# Patient Record
Sex: FEMALE | Race: WHITE
[De-identification: names, ages, dates, MRNs, and addresses within clinical notes are randomized per-mention and may not be internally consistent; named-entity substitution may affect disease eponyms.]

---

## 2019-09-25 ENCOUNTER — HOSPITAL ENCOUNTER (EMERGENCY)
Dept: HOSPITAL 35 - ER | Age: 34
Discharge: HOME | End: 2019-09-25
Payer: COMMERCIAL

## 2019-09-25 VITALS — BODY MASS INDEX: 33.9 KG/M2 | HEIGHT: 67 IN | WEIGHT: 216.01 LBS

## 2019-09-25 VITALS — DIASTOLIC BLOOD PRESSURE: 70 MMHG | SYSTOLIC BLOOD PRESSURE: 99 MMHG

## 2019-09-25 DIAGNOSIS — F32.9: ICD-10-CM

## 2019-09-25 DIAGNOSIS — Z91.040: ICD-10-CM

## 2019-09-25 DIAGNOSIS — Z88.2: ICD-10-CM

## 2019-09-25 DIAGNOSIS — Z88.8: ICD-10-CM

## 2019-09-25 DIAGNOSIS — E66.9: ICD-10-CM

## 2019-09-25 DIAGNOSIS — I10: ICD-10-CM

## 2019-09-25 DIAGNOSIS — Z88.0: ICD-10-CM

## 2019-09-25 DIAGNOSIS — F84.5: ICD-10-CM

## 2019-09-25 DIAGNOSIS — B34.9: Primary | ICD-10-CM

## 2019-09-25 DIAGNOSIS — R11.2: ICD-10-CM

## 2019-09-25 LAB
ALBUMIN SERPL-MCNC: 3.4 G/DL (ref 3.4–5)
ALT SERPL-CCNC: 21 U/L (ref 30–65)
ANION GAP SERPL CALC-SCNC: 9 MMOL/L (ref 7–16)
AST SERPL-CCNC: 17 U/L (ref 15–37)
BASOPHILS NFR BLD AUTO: 0.7 % (ref 0–2)
BILIRUB SERPL-MCNC: 0.3 MG/DL
BILIRUB UR-MCNC: NEGATIVE MG/DL
BUN SERPL-MCNC: 10 MG/DL (ref 7–18)
CALCIUM SERPL-MCNC: 8.5 MG/DL (ref 8.5–10.1)
CHLORIDE SERPL-SCNC: 105 MMOL/L (ref 98–107)
CO2 SERPL-SCNC: 26 MMOL/L (ref 21–32)
COLOR UR: YELLOW
CREAT SERPL-MCNC: 0.9 MG/DL (ref 0.6–1)
EOSINOPHIL NFR BLD: 1.6 % (ref 0–3)
ERYTHROCYTE [DISTWIDTH] IN BLOOD BY AUTOMATED COUNT: 13.5 % (ref 10.5–14.5)
GLUCOSE SERPL-MCNC: 102 MG/DL (ref 74–106)
GRANULOCYTES NFR BLD MANUAL: 56.5 % (ref 36–66)
HCT VFR BLD CALC: 41.5 % (ref 37–47)
HGB BLD-MCNC: 14 GM/DL (ref 12–15)
KETONES UR STRIP-MCNC: NEGATIVE MG/DL
LIPASE: 122 U/L (ref 73–393)
LYMPHOCYTES NFR BLD AUTO: 32.7 % (ref 24–44)
MCH RBC QN AUTO: 30.4 PG (ref 26–34)
MCHC RBC AUTO-ENTMCNC: 33.8 G/DL (ref 28–37)
MCV RBC: 90.2 FL (ref 80–100)
MONOCYTES NFR BLD: 8.5 % (ref 1–8)
NEUTROPHILS # BLD: 4.5 THOU/UL (ref 1.4–8.2)
PLATELET # BLD: 462 THOU/UL (ref 150–400)
POTASSIUM SERPL-SCNC: 4.3 MMOL/L (ref 3.5–5.1)
PROT SERPL-MCNC: 7.7 G/DL (ref 6.4–8.2)
RBC # BLD AUTO: 4.61 MIL/UL (ref 4.2–5)
RBC # UR STRIP: NEGATIVE /UL
SODIUM SERPL-SCNC: 140 MMOL/L (ref 136–145)
SP GR UR STRIP: <= 1.005 (ref 1–1.03)
URINE CLARITY: CLEAR
URINE GLUCOSE-RANDOM*: NEGATIVE
URINE LEUKOCYTES-REFLEX: NEGATIVE
URINE NITRITE-REFLEX: NEGATIVE
URINE PROTEIN (DIPSTICK): NEGATIVE
UROBILINOGEN UR STRIP-ACNC: 0.2 E.U./DL (ref 0.2–1)
WBC # BLD AUTO: 8 THOU/UL (ref 4–11)

## 2021-02-24 ENCOUNTER — HOSPITAL ENCOUNTER (EMERGENCY)
Dept: HOSPITAL 35 - ER | Age: 36
Discharge: HOME | End: 2021-02-24
Payer: COMMERCIAL

## 2021-02-24 VITALS — WEIGHT: 214.99 LBS | BODY MASS INDEX: 33.74 KG/M2 | HEIGHT: 67 IN

## 2021-02-24 VITALS — SYSTOLIC BLOOD PRESSURE: 123 MMHG | DIASTOLIC BLOOD PRESSURE: 87 MMHG

## 2021-02-24 DIAGNOSIS — Z79.899: ICD-10-CM

## 2021-02-24 DIAGNOSIS — Z91.040: ICD-10-CM

## 2021-02-24 DIAGNOSIS — I10: ICD-10-CM

## 2021-02-24 DIAGNOSIS — Z88.2: ICD-10-CM

## 2021-02-24 DIAGNOSIS — Z88.8: ICD-10-CM

## 2021-02-24 DIAGNOSIS — Z20.822: ICD-10-CM

## 2021-02-24 DIAGNOSIS — J18.9: Primary | ICD-10-CM

## 2021-02-24 DIAGNOSIS — Z88.0: ICD-10-CM

## 2021-02-24 LAB
BILIRUB UR-MCNC: NEGATIVE MG/DL
COLOR UR: YELLOW
KETONES UR STRIP-MCNC: NEGATIVE MG/DL
RBC # UR STRIP: NEGATIVE /UL
SP GR UR STRIP: 1.01 (ref 1–1.03)
URINE CLARITY: CLEAR
URINE GLUCOSE-RANDOM*: NEGATIVE
URINE LEUKOCYTES-REFLEX: NEGATIVE
URINE NITRITE-REFLEX: NEGATIVE
URINE PROTEIN (DIPSTICK): NEGATIVE
UROBILINOGEN UR STRIP-ACNC: 0.2 E.U./DL (ref 0.2–1)

## 2021-03-01 ENCOUNTER — HOSPITAL ENCOUNTER (EMERGENCY)
Dept: HOSPITAL 35 - ER | Age: 36
Discharge: HOME | End: 2021-03-01
Payer: COMMERCIAL

## 2021-03-01 VITALS — HEIGHT: 67 IN | BODY MASS INDEX: 34.53 KG/M2 | WEIGHT: 220 LBS

## 2021-03-01 VITALS — DIASTOLIC BLOOD PRESSURE: 89 MMHG | SYSTOLIC BLOOD PRESSURE: 123 MMHG

## 2021-03-01 DIAGNOSIS — I10: ICD-10-CM

## 2021-03-01 DIAGNOSIS — Z88.0: ICD-10-CM

## 2021-03-01 DIAGNOSIS — Z91.040: ICD-10-CM

## 2021-03-01 DIAGNOSIS — B02.39: Primary | ICD-10-CM

## 2021-03-01 DIAGNOSIS — Z79.899: ICD-10-CM

## 2021-03-01 DIAGNOSIS — Z88.2: ICD-10-CM

## 2021-03-01 DIAGNOSIS — Z88.8: ICD-10-CM

## 2021-03-23 ENCOUNTER — HOSPITAL ENCOUNTER (INPATIENT)
Dept: HOSPITAL 35 - ER | Age: 36
LOS: 4 days | Discharge: HOME | DRG: 871 | End: 2021-03-27
Attending: INTERNAL MEDICINE | Admitting: INTERNAL MEDICINE
Payer: COMMERCIAL

## 2021-03-23 VITALS — WEIGHT: 212.6 LBS | BODY MASS INDEX: 33.37 KG/M2 | HEIGHT: 67 IN

## 2021-03-23 VITALS — SYSTOLIC BLOOD PRESSURE: 127 MMHG | DIASTOLIC BLOOD PRESSURE: 76 MMHG

## 2021-03-23 DIAGNOSIS — F84.5: ICD-10-CM

## 2021-03-23 DIAGNOSIS — E66.01: ICD-10-CM

## 2021-03-23 DIAGNOSIS — J85.1: ICD-10-CM

## 2021-03-23 DIAGNOSIS — Z20.822: ICD-10-CM

## 2021-03-23 DIAGNOSIS — Z88.8: ICD-10-CM

## 2021-03-23 DIAGNOSIS — I10: ICD-10-CM

## 2021-03-23 DIAGNOSIS — A41.9: Primary | ICD-10-CM

## 2021-03-23 DIAGNOSIS — Z88.0: ICD-10-CM

## 2021-03-23 DIAGNOSIS — Z88.2: ICD-10-CM

## 2021-03-23 DIAGNOSIS — F32.9: ICD-10-CM

## 2021-03-23 DIAGNOSIS — Z91.040: ICD-10-CM

## 2021-03-23 LAB
ALBUMIN SERPL-MCNC: 3 G/DL (ref 3.4–5)
ALT SERPL-CCNC: 16 U/L (ref 14–59)
ANION GAP SERPL CALC-SCNC: 9 MMOL/L (ref 7–16)
AST SERPL-CCNC: 12 U/L (ref 15–37)
BILIRUB SERPL-MCNC: 0.3 MG/DL (ref 0.2–1)
BUN SERPL-MCNC: 8 MG/DL (ref 7–18)
CALCIUM SERPL-MCNC: 9.4 MG/DL (ref 8.5–10.1)
CHLORIDE SERPL-SCNC: 98 MMOL/L (ref 98–107)
CO2 SERPL-SCNC: 27 MMOL/L (ref 21–32)
CREAT SERPL-MCNC: 1 MG/DL (ref 0.6–1)
EOSINOPHIL NFR BLD: 1 % (ref 0–3)
ERYTHROCYTE [DISTWIDTH] IN BLOOD BY AUTOMATED COUNT: 15.2 % (ref 10.5–14.5)
GLUCOSE SERPL-MCNC: 162 MG/DL (ref 74–106)
GRANULOCYTES NFR BLD MANUAL: 85 % (ref 36–66)
HCT VFR BLD CALC: 37.4 % (ref 37–47)
HGB BLD-MCNC: 12.3 GM/DL (ref 12–15)
LG PLATELETS BLD QL SMEAR: (no result)
LYMPHOCYTES NFR BLD AUTO: 10 % (ref 24–44)
MCH RBC QN AUTO: 29.3 PG (ref 26–34)
MCHC RBC AUTO-ENTMCNC: 32.9 G/DL (ref 28–37)
MCV RBC: 89.2 FL (ref 80–100)
MONOCYTES NFR BLD: 3 % (ref 1–8)
NEUTROPHILS # BLD: 17.8 THOU/UL (ref 1.4–8.2)
NEUTS BAND NFR BLD: 1 % (ref 0–8)
PLATELET # BLD: 580 THOU/UL (ref 150–400)
POTASSIUM SERPL-SCNC: 3.7 MMOL/L (ref 3.5–5.1)
PROT SERPL-MCNC: 8.4 G/DL (ref 6.4–8.2)
RBC # BLD AUTO: 4.19 MIL/UL (ref 4.2–5)
RBC MORPH BLD: NORMAL
SODIUM SERPL-SCNC: 134 MMOL/L (ref 136–145)
TROPONIN I SERPL-MCNC: <0.06 NG/ML (ref ?–0.06)
WBC # BLD AUTO: 20.7 THOU/UL (ref 4–11)

## 2021-03-23 PROCEDURE — 10879: CPT

## 2021-03-24 VITALS — DIASTOLIC BLOOD PRESSURE: 71 MMHG | SYSTOLIC BLOOD PRESSURE: 110 MMHG

## 2021-03-24 VITALS — DIASTOLIC BLOOD PRESSURE: 82 MMHG | SYSTOLIC BLOOD PRESSURE: 131 MMHG

## 2021-03-24 VITALS — DIASTOLIC BLOOD PRESSURE: 87 MMHG | SYSTOLIC BLOOD PRESSURE: 128 MMHG

## 2021-03-24 VITALS — DIASTOLIC BLOOD PRESSURE: 84 MMHG | SYSTOLIC BLOOD PRESSURE: 129 MMHG

## 2021-03-24 VITALS — SYSTOLIC BLOOD PRESSURE: 131 MMHG | DIASTOLIC BLOOD PRESSURE: 86 MMHG

## 2021-03-24 VITALS — DIASTOLIC BLOOD PRESSURE: 77 MMHG | SYSTOLIC BLOOD PRESSURE: 127 MMHG

## 2021-03-24 VITALS — DIASTOLIC BLOOD PRESSURE: 81 MMHG | SYSTOLIC BLOOD PRESSURE: 122 MMHG

## 2021-03-24 LAB
ANION GAP SERPL CALC-SCNC: 10 MMOL/L (ref 7–16)
BACTERIA-REFLEX: (no result) /HPF
BILIRUB UR-MCNC: NEGATIVE MG/DL
BUN SERPL-MCNC: 9 MG/DL (ref 7–18)
CALCIUM SERPL-MCNC: 9 MG/DL (ref 8.5–10.1)
CHLORIDE SERPL-SCNC: 101 MMOL/L (ref 98–107)
CO2 SERPL-SCNC: 25 MMOL/L (ref 21–32)
COLOR UR: YELLOW
CREAT SERPL-MCNC: 1 MG/DL (ref 0.6–1)
ERYTHROCYTE [DISTWIDTH] IN BLOOD BY AUTOMATED COUNT: 14.9 % (ref 10.5–14.5)
GLUCOSE SERPL-MCNC: 150 MG/DL (ref 74–106)
HCT VFR BLD CALC: 37.6 % (ref 37–47)
HGB BLD-MCNC: 12.5 GM/DL (ref 12–15)
KETONES UR STRIP-MCNC: (no result) MG/DL
MCH RBC QN AUTO: 29.8 PG (ref 26–34)
MCHC RBC AUTO-ENTMCNC: 33.3 G/DL (ref 28–37)
MCV RBC: 89.3 FL (ref 80–100)
MUCUS: (no result) STRN/LPF
PLATELET # BLD: 554 THOU/UL (ref 150–400)
POTASSIUM SERPL-SCNC: 4.6 MMOL/L (ref 3.5–5.1)
RBC # BLD AUTO: 4.21 MIL/UL (ref 4.2–5)
RBC # UR STRIP: NEGATIVE /UL
SODIUM SERPL-SCNC: 136 MMOL/L (ref 136–145)
SP GR UR STRIP: 1.01 (ref 1–1.03)
SQUAMOUS: (no result) /LPF (ref 0–3)
URINE CLARITY: (no result)
URINE GLUCOSE-RANDOM*: NEGATIVE
URINE LEUKOCYTES-REFLEX: NEGATIVE
URINE NITRITE-REFLEX: NEGATIVE
URINE PROTEIN (DIPSTICK): (no result)
URINE WBC-REFLEX: (no result) /HPF (ref 0–5)
UROBILINOGEN UR STRIP-ACNC: 0.2 E.U./DL (ref 0.2–1)
WBC # BLD AUTO: 18.2 THOU/UL (ref 4–11)

## 2021-03-24 NOTE — EKG
[address] [place]  [address]
Phone:  [phone]                    ELECTROCARDIOGRAM REPORT      
_______________________________________________________________________________
 
Name:       BRAULIO CHRISTIANSON             Room #:         242-P       ADM IN  
M.R.#:      [id number]     Account #:      [id number]  
Admission:  21    Attend Phys:    Callie Martinez MD        
Discharge:              Date of Birth:  [birth year]  
                                                          Report #: [id number]
   [id number]
_______________________________________________________________________________
                          [facility]
                                       
Test Date:    2021               Test Time:    18:16:05
Pat Name:     BRAULIO CHRISTIANSON          Department:   
Patient ID:   [id number]            Room:         242
Gender:       F                        Technician:   MEDARDO
:          1985               Requested By: Jaime Martin
Order Number: [id number] MD:   Tal Lion
                                 Measurements
Intervals                              Axis          
Rate:         108                      P:            37
TX:           142                      QRS:          43
QRSD:         99                       T:            16
QT:           347                                    
QTc:          465                                    
                           Interpretive Statements
Sinus tachycardia
Probable left atrial enlargement
Baseline wander in lead(s) III,V1,V2
Compared to ECG 2005 18:52:05
Sinus rhythm no longer present
Electronically Signed On 3- 7:03:24 CDT by Tal Lion
[email or web address]
 
 
 
 
 
 
 
 
 
 
 
 
 
 
 
 
 
 
 
 
  <ELECTRONICALLY SIGNED>
   By: Tal Lion MD, [facility]    
  21
D: 21                           _____________________________________
T: 21                           Tal Lion MD, [facility]      /EPI

## 2021-03-25 VITALS — SYSTOLIC BLOOD PRESSURE: 126 MMHG | DIASTOLIC BLOOD PRESSURE: 84 MMHG

## 2021-03-25 VITALS — DIASTOLIC BLOOD PRESSURE: 79 MMHG | SYSTOLIC BLOOD PRESSURE: 121 MMHG

## 2021-03-25 VITALS — DIASTOLIC BLOOD PRESSURE: 89 MMHG | SYSTOLIC BLOOD PRESSURE: 138 MMHG

## 2021-03-25 VITALS — DIASTOLIC BLOOD PRESSURE: 95 MMHG | SYSTOLIC BLOOD PRESSURE: 138 MMHG

## 2021-03-25 LAB
BASOPHILS NFR BLD AUTO: 0.2 % (ref 0–2)
EOSINOPHIL NFR BLD: 0.4 % (ref 0–3)
ERYTHROCYTE [DISTWIDTH] IN BLOOD BY AUTOMATED COUNT: 14.6 % (ref 10.5–14.5)
GRANULOCYTES NFR BLD MANUAL: 74.8 % (ref 36–66)
HCT VFR BLD CALC: 32.9 % (ref 37–47)
HGB BLD-MCNC: 10.9 GM/DL (ref 12–15)
LYMPHOCYTES NFR BLD AUTO: 17.9 % (ref 24–44)
MCH RBC QN AUTO: 29.3 PG (ref 26–34)
MCHC RBC AUTO-ENTMCNC: 33 G/DL (ref 28–37)
MCV RBC: 88.9 FL (ref 80–100)
MONOCYTES NFR BLD: 6.7 % (ref 1–8)
NEUTROPHILS # BLD: 12.5 THOU/UL (ref 1.4–8.2)
PLATELET # BLD: 562 THOU/UL (ref 150–400)
RBC # BLD AUTO: 3.71 MIL/UL (ref 4.2–5)
WBC # BLD AUTO: 16.8 THOU/UL (ref 4–11)

## 2021-03-25 NOTE — HC
[facility]
[address]
[place]   [address]                     CONSULTATION                  
_______________________________________________________________________________
 
Name:       BRAULIO CHRISTIANSON             Room #:         354-P       ADM IN  
M.R.#:      [id number]                       Account #:      [id number]  
Admission:  03/23/21    Attend Phys:    Callie Martinez MD        
Discharge:              Date of Birth:  [birth year]  
                                                          Report #: [id number]
                                                                    [id number]   
_______________________________________________________________________________
THIS REPORT FOR:  
 
cc:  Michelle Tompkins MD, Molly MD Barry, Joseph W. MD                                           ~
 
DATE OF SERVICE:  03/24/2021
 
 
INFECTIOUS DISEASE CONSULTATION
 
ATTENDING PHYSICIAN:  Dr. Martinez.
 
REASON FOR EVALUATION:  Pneumonitis, possible pulmonary abscess.
 
HISTORY OF PRESENT ILLNESS:  Chart reviewed, patient examined.  This 35-year-old
female with a recent history of pneumonitis, was actually treated roughly 1
month ago, developed increasing difficulty with productive cough, also had what
sounds like pleuritic-type chest pain on the left.  Of note, she had some
anorexia and poor p.o. intake.  Denies significant GI related complaints.  Due
to her complaints, she was evaluated in the Emergency Room, felt to have a
mildly elevated lactic acid of 1.6.  White count had elevation of 20.7.  Chest
x-ray showed mass-like infiltrate, small left pleural effusion.  CT and followup
noted infiltrate and developing pulmonary abscess along the pleura of the left
lower lobe laterally 6.1 cm, small left pleural effusion.  Blood cultures
collected on admission are sterile thus far.  Urinalysis is unremarkable thus
far.  Coronavirus testing was negative.  Sputum culture has been collected,
which appeared to show contamination.  Currently, she has not required
supplemental oxygen.  She was empirically started on combination therapy with
levofloxacin and Zosyn.
 
ALLERGIES:  PENICILLIN, WHICH CAUSES A RASH; SULFA, WHICH CAUSES A RASH; LANOLIN
AND LATEX.
 
CURRENT MEDICATIONS:  Include gabapentin, metoprolol, amlodipine, loratadine,
alprazolam, fluoxetine, famotidine, Zosyn, Levaquin, p.r.n. analgesics and
antiemetics.
 
PAST MEDICAL HISTORY:  History of Asperger's syndrome, hypertension, depression,
autism.
 
SOCIAL HISTORY:  Nonsmoker, no ethanol, no illicit drug use.
 
FAMILY HISTORY:  Noncontributory.
 
REVIEW OF SYSTEMS:  Otherwise, unremarkable 10-point review of systems.
 
 
 
[address]   [address]                     CONSULTATION                  
_______________________________________________________________________________
 
Name:       ANTONETTEMARCIABRAULIO             Room #:         354-P       [facility] [place]  
[facility].#:      [id number]                       Account #:      [id number]  
Admission:  03/23/21    Attend Phys:    Callie Martinez MD        
Discharge:              Date of Birth:  [birth year]  
                                                          Report #: [id number]
                                                                    [id number]   
_______________________________________________________________________________
 
 
PHYSICAL EXAMINATION:
GENERAL:  Appears to be mildly encephalopathic, in mild-to-moderate distress.
VITAL SIGNS:  Temperature 97.7, pulse 91, respirations 18, blood pressure
131/86.
SKIN:  Warm, dry, no rashes.
HEENT:  Normocephalic.  Extraocular muscles are intact.
NECK:  Supple.
LUNGS:  Bilateral crackles at the bases.
HEART:  Regular.  I do not appreciate a murmur.
ABDOMEN:  Soft, nontender, nondistended.
EXTREMITIES:  No cyanosis.
GENITOURINARY AND RECTAL:  Deferred.
 
LABORATORY DATA:  Sputum cultures described above rejected due to likely
contaminant.  Coronavirus PCR was negative.  Procalcitonin of 0.12.  Lactic acid
repeated as well of 1.2.  CBC:  White count of 18.2, H and H of 12.5 and 37.6,
platelets of 554.  CTA chest PE protocol showed no evidence of PE, infiltrate
developing pulmonary abscess along the floor of the left lower lobe laterally. 
ProBNP of 88.
 
ASSESSMENT AND PLAN:  Pneumonitis, perhaps complicated by early pulmonary
abscess on the left lower lobe, does have what clinically sounds like
pleuritic-type chest pain.  We will continue antibiotic regimen, adjust therapy,
be concerned about a polymicrobial possible mixed infection.  At this point, she
is not overtly toxic.  She is not requiring supplemental oxygen, although may
need to repeat imaging here in the next few days to see if it localizes and
maybe would be amenable to percutaneous drainage.  Continue to monitor
expectantly.  She is tenuous at this point.
 
 
 
 
 
 
 
 
 
 
 
 
 
 
  <ELECTRONICALLY SIGNED>
   By: Carlos Lu MD           
  03/25/21     1222
D: 03/24/21 1701                           _____________________________________
T: 03/24/21 2025                           Carlos Lu MD             /nt

## 2021-03-26 VITALS — DIASTOLIC BLOOD PRESSURE: 76 MMHG | SYSTOLIC BLOOD PRESSURE: 129 MMHG

## 2021-03-26 VITALS — SYSTOLIC BLOOD PRESSURE: 130 MMHG | DIASTOLIC BLOOD PRESSURE: 84 MMHG

## 2021-03-26 VITALS — DIASTOLIC BLOOD PRESSURE: 73 MMHG | SYSTOLIC BLOOD PRESSURE: 143 MMHG

## 2021-03-27 VITALS — SYSTOLIC BLOOD PRESSURE: 135 MMHG | DIASTOLIC BLOOD PRESSURE: 98 MMHG

## 2021-03-27 VITALS — SYSTOLIC BLOOD PRESSURE: 119 MMHG | DIASTOLIC BLOOD PRESSURE: 84 MMHG

## 2021-03-27 VITALS — DIASTOLIC BLOOD PRESSURE: 84 MMHG | SYSTOLIC BLOOD PRESSURE: 119 MMHG

## 2021-03-27 LAB
ANION GAP SERPL CALC-SCNC: 11 MMOL/L (ref 7–16)
BASOPHILS NFR BLD AUTO: 0.7 % (ref 0–2)
BUN SERPL-MCNC: 6 MG/DL (ref 7–18)
CALCIUM SERPL-MCNC: 8.1 MG/DL (ref 8.5–10.1)
CHLORIDE SERPL-SCNC: 106 MMOL/L (ref 98–107)
CO2 SERPL-SCNC: 24 MMOL/L (ref 21–32)
CREAT SERPL-MCNC: 0.8 MG/DL (ref 0.6–1)
EOSINOPHIL NFR BLD: 3.6 % (ref 0–3)
ERYTHROCYTE [DISTWIDTH] IN BLOOD BY AUTOMATED COUNT: 14.9 % (ref 10.5–14.5)
GLUCOSE SERPL-MCNC: 88 MG/DL (ref 74–106)
GRANULOCYTES NFR BLD MANUAL: 59.7 % (ref 36–66)
HCT VFR BLD CALC: 31.9 % (ref 37–47)
HGB BLD-MCNC: 10.7 GM/DL (ref 12–15)
LYMPHOCYTES NFR BLD AUTO: 30.7 % (ref 24–44)
MAGNESIUM SERPL-MCNC: 1.6 MG/DL (ref 1.8–2.4)
MCH RBC QN AUTO: 30.2 PG (ref 26–34)
MCHC RBC AUTO-ENTMCNC: 33.7 G/DL (ref 28–37)
MCV RBC: 89.5 FL (ref 80–100)
MONOCYTES NFR BLD: 5.3 % (ref 1–8)
NEUTROPHILS # BLD: 6.8 THOU/UL (ref 1.4–8.2)
PLATELET # BLD: 555 THOU/UL (ref 150–400)
POTASSIUM SERPL-SCNC: 3.1 MMOL/L (ref 3.5–5.1)
RBC # BLD AUTO: 3.56 MIL/UL (ref 4.2–5)
SODIUM SERPL-SCNC: 141 MMOL/L (ref 136–145)
WBC # BLD AUTO: 11.3 THOU/UL (ref 4–11)

## 2021-04-27 ENCOUNTER — HOSPITAL ENCOUNTER (OUTPATIENT)
Dept: HOSPITAL 35 - RAD | Age: 36
End: 2021-04-27
Attending: INTERNAL MEDICINE
Payer: COMMERCIAL

## 2021-04-27 DIAGNOSIS — J98.11: ICD-10-CM

## 2021-04-27 DIAGNOSIS — R91.8: Primary | ICD-10-CM
